# Patient Record
Sex: MALE | Race: WHITE | NOT HISPANIC OR LATINO | URBAN - METROPOLITAN AREA
[De-identification: names, ages, dates, MRNs, and addresses within clinical notes are randomized per-mention and may not be internally consistent; named-entity substitution may affect disease eponyms.]

---

## 2023-08-07 LAB
ALANINE AMINOTRANSFERASE (SGPT) (U/L) IN SER/PLAS: 19 U/L (ref 10–52)
ALBUMIN (G/DL) IN SER/PLAS: 4.5 G/DL (ref 3.4–5)
ALKALINE PHOSPHATASE (U/L) IN SER/PLAS: 67 U/L (ref 33–136)
ANION GAP IN SER/PLAS: 12 MMOL/L (ref 10–20)
ASPARTATE AMINOTRANSFERASE (SGOT) (U/L) IN SER/PLAS: 26 U/L (ref 9–39)
BILIRUBIN TOTAL (MG/DL) IN SER/PLAS: 2 MG/DL (ref 0–1.2)
CALCIUM (MG/DL) IN SER/PLAS: 9.2 MG/DL (ref 8.6–10.3)
CARBON DIOXIDE, TOTAL (MMOL/L) IN SER/PLAS: 27 MMOL/L (ref 21–32)
CHLORIDE (MMOL/L) IN SER/PLAS: 103 MMOL/L (ref 98–107)
CHOLESTEROL (MG/DL) IN SER/PLAS: 191 MG/DL (ref 0–199)
CHOLESTEROL IN HDL (MG/DL) IN SER/PLAS: 60.6 MG/DL
CHOLESTEROL/HDL RATIO: 3.2
CREATININE (MG/DL) IN SER/PLAS: 0.93 MG/DL (ref 0.5–1.3)
GFR MALE: >90 ML/MIN/1.73M2
GLUCOSE (MG/DL) IN SER/PLAS: 89 MG/DL (ref 74–99)
LDL: 117 MG/DL (ref 0–99)
POTASSIUM (MMOL/L) IN SER/PLAS: 4.4 MMOL/L (ref 3.5–5.3)
PROTEIN TOTAL: 7.2 G/DL (ref 6.4–8.2)
SODIUM (MMOL/L) IN SER/PLAS: 138 MMOL/L (ref 136–145)
TRIGLYCERIDE (MG/DL) IN SER/PLAS: 68 MG/DL (ref 0–149)
UREA NITROGEN (MG/DL) IN SER/PLAS: 15 MG/DL (ref 6–23)
VLDL: 14 MG/DL (ref 0–40)

## 2023-10-19 ENCOUNTER — HOSPITAL ENCOUNTER (OUTPATIENT)
Dept: NEUROLOGY | Facility: CLINIC | Age: 63
Discharge: HOME | End: 2023-10-19
Payer: COMMERCIAL

## 2023-10-19 DIAGNOSIS — G56.02 LEFT CARPAL TUNNEL SYNDROME: ICD-10-CM

## 2023-10-19 PROCEDURE — 95911 NRV CNDJ TEST 9-10 STUDIES: CPT | Performed by: SPECIALIST

## 2023-10-19 PROCEDURE — 95886 MUSC TEST DONE W/N TEST COMP: CPT | Performed by: SPECIALIST

## 2023-10-19 PROCEDURE — 95909 NRV CNDJ TST 5-6 STUDIES: CPT | Performed by: SPECIALIST

## 2024-03-12 ENCOUNTER — HOSPITAL ENCOUNTER (OUTPATIENT)
Dept: RADIOLOGY | Facility: HOSPITAL | Age: 64
Discharge: HOME | End: 2024-03-12

## 2024-03-12 DIAGNOSIS — E78.5 HYPERLIPIDEMIA, UNSPECIFIED: ICD-10-CM

## 2024-03-12 DIAGNOSIS — Z82.49 FAMILY HISTORY OF ISCHEMIC HEART DISEASE AND OTHER DISEASES OF THE CIRCULATORY SYSTEM: ICD-10-CM

## 2024-03-12 PROCEDURE — 75571 CT HRT W/O DYE W/CA TEST: CPT

## 2024-03-14 ENCOUNTER — TELEPHONE (OUTPATIENT)
Dept: NEPHROLOGY | Facility: CLINIC | Age: 64
End: 2024-03-14
Payer: COMMERCIAL

## 2024-03-14 NOTE — TELEPHONE ENCOUNTER
----- Message from Thiago Tidwell MD sent at 3/13/2024  4:29 PM EDT -----  CT shows potential blockages in heart arteries.  Sched OV 1-2 weeks

## 2024-03-15 ENCOUNTER — TELEPHONE (OUTPATIENT)
Dept: CARDIOLOGY | Facility: CLINIC | Age: 64
End: 2024-03-15
Payer: COMMERCIAL

## 2024-03-15 NOTE — TELEPHONE ENCOUNTER
Patient's wife called and requested to have lab orders mailed so they can have the labs done at [facility].

## 2024-03-27 NOTE — TELEPHONE ENCOUNTER
Pt left vm requesting lab order, left vm to call office back to advise he does not need labs done.

## 2024-03-28 ENCOUNTER — OFFICE VISIT (OUTPATIENT)
Dept: PRIMARY CARE | Facility: CLINIC | Age: 64
End: 2024-03-28
Payer: COMMERCIAL

## 2024-03-28 VITALS
BODY MASS INDEX: 25.17 KG/M2 | TEMPERATURE: 97.3 F | HEART RATE: 71 BPM | WEIGHT: 185.8 LBS | HEIGHT: 72 IN | SYSTOLIC BLOOD PRESSURE: 119 MMHG | DIASTOLIC BLOOD PRESSURE: 72 MMHG

## 2024-03-28 DIAGNOSIS — Z12.5 PROSTATE CANCER SCREENING: ICD-10-CM

## 2024-03-28 DIAGNOSIS — E78.5 HYPERLIPIDEMIA, UNSPECIFIED HYPERLIPIDEMIA TYPE: ICD-10-CM

## 2024-03-28 DIAGNOSIS — E66.3 OVERWEIGHT WITH BODY MASS INDEX (BMI) 25.0-29.9: ICD-10-CM

## 2024-03-28 DIAGNOSIS — Z78.9 NEVER SMOKED CIGARETTES: ICD-10-CM

## 2024-03-28 DIAGNOSIS — R93.1 AGATSTON CAC SCORE, >400: ICD-10-CM

## 2024-03-28 PROCEDURE — 99213 OFFICE O/P EST LOW 20 MIN: CPT | Performed by: FAMILY MEDICINE

## 2024-03-28 PROCEDURE — 1036F TOBACCO NON-USER: CPT | Performed by: FAMILY MEDICINE

## 2024-03-28 RX ORDER — ASPIRIN 81 MG/1
81 TABLET ORAL DAILY
Qty: 90 TABLET | Refills: 3 | Status: SHIPPED | OUTPATIENT
Start: 2024-03-28

## 2024-03-28 RX ORDER — ASPIRIN 81 MG/1
81 TABLET ORAL DAILY PRN
COMMUNITY
End: 2024-03-28 | Stop reason: SDUPTHER

## 2024-03-28 RX ORDER — VIT A/VIT C/VIT E/ZINC/COPPER 4296-226
1 CAPSULE ORAL DAILY
COMMUNITY

## 2024-03-28 ASSESSMENT — PATIENT HEALTH QUESTIONNAIRE - PHQ9
1. LITTLE INTEREST OR PLEASURE IN DOING THINGS: NOT AT ALL
2. FEELING DOWN, DEPRESSED OR HOPELESS: NOT AT ALL
SUM OF ALL RESPONSES TO PHQ9 QUESTIONS 1 AND 2: 0

## 2024-03-28 NOTE — PROGRESS NOTES
"Subjective Ed is here for follow-up on his CT cardiac calcium score.  He states that he is feeling well and has no complaints.  He has no chest pain shortness of breath lightheadedness or syncope.  He did have a stress test at least 5 years ago possibly longer.  There is a family history of coronary artery disease in his father.  Also family history of abdominal aortic aneurysm in his father.  He had a normal ultrasound about 4 years ago.  Recent CT cardiac calcium score is total 450.    Patient ID: Matthew Cervantes \"Ed\" is a 63 y.o. male who presents for Results:    Problem List Items Addressed This Visit    None     History reviewed. No pertinent past medical history.   History reviewed. No pertinent surgical history.   Family History   Problem Relation Name Age of Onset    Depression Mother Jacqueline Cervantes       Social History     Socioeconomic History    Marital status:      Spouse name: Not on file    Number of children: Not on file    Years of education: Not on file    Highest education level: Not on file   Occupational History    Not on file   Tobacco Use    Smoking status: Never    Smokeless tobacco: Never   Substance and Sexual Activity    Alcohol use: Yes     Alcohol/week: 3.0 standard drinks of alcohol     Types: 3 Cans of beer per week    Drug use: Never    Sexual activity: Yes     Partners: Female     Birth control/protection: None   Other Topics Concern    Not on file   Social History Narrative    Not on file     Social Determinants of Health     Financial Resource Strain: Not on file   Food Insecurity: Not on file   Transportation Needs: Not on file   Physical Activity: Not on file   Stress: Not on file   Social Connections: Not on file   Intimate Partner Violence: Not on file   Housing Stability: Not on file      Patient has no known allergies.   Current Outpatient Medications   Medication Sig Dispense Refill    aspirin 81 mg EC tablet Take 1 tablet (81 mg) by mouth once daily as needed for mild " pain (1 - 3).      multivitamin with minerals tablet Take 1 tablet by mouth once daily.      vitamins A,C,E-zinc-copper (PreserVision AREDS) 4,296 mcg-226 mg-90 mg capsule Take 1 capsule by mouth once daily.       No current facility-administered medications for this visit.       Immunization History   Administered Date(s) Administered    Moderna COVID-19 vaccine, bivalent, blue cap/gray label *Check age/dose* 10/16/2022    Moderna SARS-CoV-2 Vaccination 03/17/2021, 04/14/2021, 12/04/2021        Review of Systems   Constitutional: Negative.    HENT: Negative.     Eyes: Negative.    Respiratory: Negative.     Cardiovascular: Negative.    Gastrointestinal: Negative.    Endocrine: Negative.    Genitourinary: Negative.    Musculoskeletal: Negative.    Skin: Negative.    Allergic/Immunologic: Negative.    Hematological: Negative.    Psychiatric/Behavioral: Negative.     All other systems reviewed and are negative.       Vitals:    03/28/24 1615   BP: 119/72   Pulse: 71   Temp: 36.3 °C (97.3 °F)     Vitals:    03/28/24 1615   Weight: 84.3 kg (185 lb 12.8 oz)      Physical Exam  Constitutional:       General: He is not in acute distress.     Appearance: Normal appearance.   Neck:      Vascular: No carotid bruit.   Cardiovascular:      Rate and Rhythm: Normal rate and regular rhythm.      Pulses: Normal pulses.      Heart sounds: Normal heart sounds. No murmur heard.     No friction rub. No gallop.   Pulmonary:      Effort: Pulmonary effort is normal. No respiratory distress.      Breath sounds: Normal breath sounds. No wheezing or rales.   Musculoskeletal:      Cervical back: Normal range of motion and neck supple.   Neurological:      Mental Status: He is alert.          ASSESSMENT/PLAN: Elevated  CT cardiac calcium score.  Family history of CAD.  Patient is referred to  cardiology for evaluation we discussed the importance of this.  He is currently on aspirin 81 mg daily.  Obtain CMP lipid profile and PSA.  Will need  statin medication pending above labs and cardiology evaluation.  Will also need follow-up ultrasound of abdominal aorta.  Patient instructed to go to the ER for any chest pain shortness of breath or any other cardiac symptoms.  Follow-up pending above and call as needed       Scribe Attestation  By signing my name below, I, Steff Mackenzie LPN, Scribe   attest that this documentation has been prepared under the direction and in the presence of Thiago Tidwell MD.

## 2024-03-30 ASSESSMENT — ENCOUNTER SYMPTOMS
RESPIRATORY NEGATIVE: 1
HEMATOLOGIC/LYMPHATIC NEGATIVE: 1
PSYCHIATRIC NEGATIVE: 1
GASTROINTESTINAL NEGATIVE: 1
CARDIOVASCULAR NEGATIVE: 1
EYES NEGATIVE: 1
CONSTITUTIONAL NEGATIVE: 1
ENDOCRINE NEGATIVE: 1
ALLERGIC/IMMUNOLOGIC NEGATIVE: 1
MUSCULOSKELETAL NEGATIVE: 1

## 2024-04-12 ENCOUNTER — OFFICE VISIT (OUTPATIENT)
Dept: CARDIOLOGY | Facility: CLINIC | Age: 64
End: 2024-04-12
Payer: COMMERCIAL

## 2024-04-12 VITALS
BODY MASS INDEX: 24.79 KG/M2 | DIASTOLIC BLOOD PRESSURE: 80 MMHG | HEIGHT: 72 IN | TEMPERATURE: 97.2 F | HEART RATE: 62 BPM | WEIGHT: 183 LBS | SYSTOLIC BLOOD PRESSURE: 114 MMHG

## 2024-04-12 DIAGNOSIS — Z78.9 NEVER SMOKED ANY SUBSTANCE: ICD-10-CM

## 2024-04-12 DIAGNOSIS — R93.1 AGATSTON CAC SCORE, >400: ICD-10-CM

## 2024-04-12 DIAGNOSIS — E78.00 ELEVATED LDL CHOLESTEROL LEVEL: Primary | ICD-10-CM

## 2024-04-12 PROCEDURE — 99204 OFFICE O/P NEW MOD 45 MIN: CPT | Performed by: INTERNAL MEDICINE

## 2024-04-12 PROCEDURE — 93000 ELECTROCARDIOGRAM COMPLETE: CPT | Performed by: INTERNAL MEDICINE

## 2024-04-12 PROCEDURE — 1036F TOBACCO NON-USER: CPT | Performed by: INTERNAL MEDICINE

## 2024-04-12 RX ORDER — ATORVASTATIN CALCIUM 40 MG/1
40 TABLET, FILM COATED ORAL DAILY
Qty: 90 TABLET | Refills: 3 | Status: SHIPPED | OUTPATIENT
Start: 2024-04-12 | End: 2025-04-12

## 2024-04-12 ASSESSMENT — PATIENT HEALTH QUESTIONNAIRE - PHQ9
SUM OF ALL RESPONSES TO PHQ9 QUESTIONS 1 AND 2: 0
1. LITTLE INTEREST OR PLEASURE IN DOING THINGS: NOT AT ALL
2. FEELING DOWN, DEPRESSED OR HOPELESS: NOT AT ALL

## 2024-04-12 ASSESSMENT — ENCOUNTER SYMPTOMS
EYES NEGATIVE: 1
GASTROINTESTINAL NEGATIVE: 1
MUSCULOSKELETAL NEGATIVE: 1
CARDIOVASCULAR NEGATIVE: 1
RESPIRATORY NEGATIVE: 1
NUMBNESS: 1
PSYCHIATRIC NEGATIVE: 1

## 2024-04-12 NOTE — PROGRESS NOTES
"CARDIOLOGY NEW PATIENT OFFICE VISIT    Patient:  Matthew Cervantes  YOB: 1960  MRN: [id number]       Chief Complaint/Active Symptoms:       Matthew Cervantes is a 63 y.o. male who is being seen today at the request of DR. Tidwell for evaluation of Elevated CAC.    Chief Complaint   Patient presents with    Establish Care       History of Present Illness:   HPI    Here due to an elevated CT cardiac score of 415. This was done electively due to a family history of CAD. In terms of symptoms he has no chest pain or discomfort. No shortness of breath, no palpitations, dizziness or lightheadedness.     Had a feeling of \"cold\" in his hands and pale but not in response to a cold environment of submersion in cold water which has mostly resolved. Also had mild left calf swelling (not ankle/feet) which has resolved.     Had cardiac work-up in 2016 for CP at that time and work-up with nuclear stress test and echo was normal.     FH:  Mother with CHF, maternal cousins with CAD at young age  Father had CABG in his 60's  Allergies:     No Known Allergies     Outpatient Medications:     Current Outpatient Medications   Medication Instructions    aspirin 81 mg, oral, Daily    multivitamin with minerals tablet 1 tablet, oral, Daily    vitamins A,C,E-zinc-copper (PreserVision AREDS) 4,296 mcg-226 mg-90 mg capsule 1 capsule, oral, Daily        Past Medical History:     No past medical history on file.    Social History:     Social History     Socioeconomic History    Marital status:      Spouse name: None    Number of children: None    Years of education: None    Highest education level: None   Occupational History    None   Tobacco Use    Smoking status: Never    Smokeless tobacco: Never   Substance and Sexual Activity    Alcohol use: Yes     Alcohol/week: 3.0 standard drinks of alcohol     Types: 3 Cans of beer per week    Drug use: Never    Sexual activity: Yes     Partners: Female     Birth control/protection: None "   Other Topics Concern    None   Social History Narrative    None     Social Determinants of Health     Financial Resource Strain: Not on file   Food Insecurity: Not on file   Transportation Needs: Not on file   Physical Activity: Not on file   Stress: Not on file   Social Connections: Not on file   Intimate Partner Violence: Not on file   Housing Stability: Not on file       Family History:        Family History   Problem Relation Name Age of Onset    Depression Mother Jacqueline Cervantes     Other (aaa) Father         Review of Systems:     Review of Systems   HENT: Negative.     Eyes: Negative.    Respiratory: Negative.     Cardiovascular: Negative.    Gastrointestinal: Negative.    Genitourinary: Negative.    Musculoskeletal: Negative.    Neurological:  Positive for numbness (carpal tunnel left hand).   Psychiatric/Behavioral: Negative.     All other systems reviewed and are negative.      Objective:     Vitals:    04/12/24 0759   BP: 114/80   Pulse: 62   Temp: 36.2 °C (97.2 °F)       Vitals:    04/12/24 0759   Weight: 83 kg (183 lb)       Physical Examination:   GENERAL:  Well developed, well nourished, in no acute distress.  HEENT: NC AT EOMI with anicteric sclera  NECK:  Supple, no JVD, no bruit.  CHEST:  Symmetric and nontender.  LUNGS:  Normal respiratory effort. Bilateral breath sounds clear to auscultation.   HEART:  PMI nondisplaced. Rate and rhythm regular with no evident murmur, no gallop appreciated. There are no rubs, clicks or heaves.  PERIPHERAL VASCULAR:  Pulses present and equally palpable; 2+ throughout.  ABDOMEN: Soft, NT, ND without HSM or palpable organomegaly, no bruits, normoactive bowel sounds  MUSCULOSKELETAL: No significant joint or limb deformity  EXTREMITIES:  Warm with good color, no clubbing or cyanosis.  There is no edema noted.  NEURO/PSYCH:  Alert and oriented times three with approppriate behavior and responses.  LYMPH: no significant palpable lymphadenopathy  SKIN: No rashes on  "exposed skin, no reported skin lesions.     Lab:     CBC:   Lab Results   Component Value Date    WBC 5.6 02/02/2023    RBC 5.35 02/02/2023    HGB 15.9 02/02/2023    HCT 48.3 02/02/2023     02/02/2023      Lab Results   Component Value Date    WBC 5.6 02/02/2023    WBC 7.0 04/30/2021    WBC 5.2 12/06/2019    RBC 5.35 02/02/2023    RBC 5.35 04/30/2021    RBC 5.14 12/06/2019    HGB 15.9 02/02/2023    HGB 16.2 04/30/2021    HGB 15.2 12/06/2019    HCT 48.3 02/02/2023    HCT 50.4 04/30/2021    HCT 46.7 12/06/2019    MCV 90 02/02/2023    MCV 94 04/30/2021    MCV 91 12/06/2019    MCHC 32.9 02/02/2023    MCHC 32.1 04/30/2021    MCHC 32.5 12/06/2019    RDW 12.0 02/02/2023    RDW 12.4 04/30/2021    RDW 12.4 12/06/2019     02/02/2023     04/30/2021     12/06/2019       CMP:    Lab Results   Component Value Date     08/07/2023    K 4.4 08/07/2023     08/07/2023    CO2 27 08/07/2023    BUN 15 08/07/2023    CREATININE 0.93 08/07/2023    GLUCOSE 89 08/07/2023    CALCIUM 9.2 08/07/2023     Lab Results   Component Value Date     08/07/2023     02/02/2023     04/30/2021    K 4.4 08/07/2023    K 4.3 02/02/2023    K 4.2 04/30/2021     08/07/2023     02/02/2023     04/30/2021    CO2 27 08/07/2023    CO2 29 02/02/2023    CO2 27 04/30/2021    BUN 15 08/07/2023    BUN 10 02/02/2023    BUN 17 04/30/2021    CREATININE 0.93 08/07/2023    CREATININE 0.95 02/02/2023    CREATININE 1.06 04/30/2021     Lab Results   Component Value Date    ALKPHOS 67 08/07/2023    ALT 19 08/07/2023    AST 26 08/07/2023    PROT 7.2 08/07/2023    BILITOT 2.0 (H) 08/07/2023    BILIDIR 0.1 05/19/2021       Magnesium:    No results found for: \"MG\"    Lipid Profile:    Lab Results   Component Value Date    TRIG 68 08/07/2023    HDL 60.6 08/07/2023       TSH:    No results found for: \"TSH\"    BNP:   No results found for: \"BNP\"     PT/INR:    No results found for: \"PROTIME\", \"INR\"    HgBA1c:    No " "results found for: \"HGBA1C\"    Cardiac Enzymes:    No results found for: \"TROPHS\"      Diagnostic Studies:     No echocardiogram results found for the past 12 months  Prior echocardiogram in June 2016 showed normal LV function with an EF of 60 to 65% no significant valvular abnormalities.  Abdominal ultrasound screening for AAA showed mild diffuse atherosclerosis with normal-sized aorta  No nuclear medicine results found for the past 12 months  Last nuclear stress test was performed in June 2016 that showed normal myocardial perfusion, normal wall motion and normal ejection fraction  No valid procedures specified.  EKG today shows normal sinus rhythm, normal EKG  Radiology:     No valid procedures specified.    Assessment:     Problem List Items Addressed This Visit       Never smoked any substance    Agatston CAC score, >400    Relevant Medications    atorvastatin (Lipitor) 40 mg tablet    Other Relevant Orders    ECG 12 lead (Clinic Performed) (Completed)    Lipid Panel    Elevated LDL cholesterol level - Primary    Relevant Medications    atorvastatin (Lipitor) 40 mg tablet    Other Relevant Orders    Lipid Panel       Plan:     1.  Coronary calcium score 450.  The patient is asymptomatic with a score of 450.  He has had a prior cardiac workup that has been negative.  At this point in time he needs to be on statin medications with an LDL cholesterol ideally less than 70.  If he becomes symptomatic would recommend stress testing.  Otherwise we will treat him with aggressive lipid lowering and repeat a CT cardiac score in 2 years.  In the asymptomatic state will do a stress test at greater than 800.  We reviewed the signs and symptoms of coronary artery disease and angina pectoris and when to seek emergency medical attention when to call for an earlier office appointment.  2.  Elevated LDL cholesterol.  The patient has had a mild elevation of his LDL cholesterol for many years.  Set statin therapy is indicated for " a calcium score greater than 100.  I recommended a moderate intensity statin and requested get his lipid panel done in 3 months.  If he is not at his goal we will then increase the statin or add Zetia to his medical regimen.  3.  Tobacco weight status.  The patient is a non-smoker and is at his ideal body weight.  A heart healthy Mediterranean diet was recommended.  On review of this patient states he is pretty much following that diet already.    In the absence of symptoms we will see the patient in follow-up in a years time sooner if he has any questions or concerns.

## 2024-04-15 ENCOUNTER — APPOINTMENT (OUTPATIENT)
Dept: PRIMARY CARE | Facility: CLINIC | Age: 64
End: 2024-04-15
Payer: COMMERCIAL

## 2024-07-19 DIAGNOSIS — R93.1 AGATSTON CAC SCORE, >400: ICD-10-CM

## 2024-07-19 DIAGNOSIS — E78.00 ELEVATED LDL CHOLESTEROL LEVEL: ICD-10-CM

## 2024-07-19 RX ORDER — ATORVASTATIN CALCIUM 40 MG/1
40 TABLET, FILM COATED ORAL DAILY
Qty: 90 TABLET | Refills: 3 | Status: SHIPPED | OUTPATIENT
Start: 2024-07-19 | End: 2025-07-19

## 2024-07-19 NOTE — TELEPHONE ENCOUNTER
Pt left  requesting refill to [facility].   Lov 4/12/24  Pov 4/11/25

## 2025-04-04 ENCOUNTER — PATIENT MESSAGE (OUTPATIENT)
Dept: PRIMARY CARE | Facility: CLINIC | Age: 65
End: 2025-04-04

## 2025-04-04 DIAGNOSIS — Z00.00 ANNUAL WELLNESS VISIT: Primary | ICD-10-CM

## 2025-04-04 DIAGNOSIS — R93.1 AGATSTON CAC SCORE, >400: ICD-10-CM

## 2025-04-04 DIAGNOSIS — E78.00 ELEVATED LDL CHOLESTEROL LEVEL: ICD-10-CM

## 2025-04-11 ENCOUNTER — APPOINTMENT (OUTPATIENT)
Dept: CARDIOLOGY | Facility: CLINIC | Age: 65
End: 2025-04-11
Payer: COMMERCIAL

## 2025-04-25 ENCOUNTER — APPOINTMENT (OUTPATIENT)
Dept: CARDIOLOGY | Facility: CLINIC | Age: 65
End: 2025-04-25
Payer: COMMERCIAL

## 2025-04-25 VITALS
HEART RATE: 65 BPM | HEIGHT: 72 IN | SYSTOLIC BLOOD PRESSURE: 118 MMHG | DIASTOLIC BLOOD PRESSURE: 72 MMHG | WEIGHT: 182.6 LBS | BODY MASS INDEX: 24.73 KG/M2

## 2025-04-25 DIAGNOSIS — E78.00 ELEVATED LDL CHOLESTEROL LEVEL: ICD-10-CM

## 2025-04-25 DIAGNOSIS — R93.1 AGATSTON CAC SCORE, >400: Primary | ICD-10-CM

## 2025-04-25 DIAGNOSIS — Z78.9 NEVER SMOKED ANY SUBSTANCE: ICD-10-CM

## 2025-04-25 PROCEDURE — 3008F BODY MASS INDEX DOCD: CPT | Performed by: INTERNAL MEDICINE

## 2025-04-25 PROCEDURE — G2211 COMPLEX E/M VISIT ADD ON: HCPCS | Performed by: INTERNAL MEDICINE

## 2025-04-25 PROCEDURE — 99214 OFFICE O/P EST MOD 30 MIN: CPT | Performed by: INTERNAL MEDICINE

## 2025-04-25 PROCEDURE — 93000 ELECTROCARDIOGRAM COMPLETE: CPT | Performed by: INTERNAL MEDICINE

## 2025-04-25 RX ORDER — GLUCOSAMINE/CHONDR SU A SOD 250-200 MG
TABLET ORAL
COMMUNITY

## 2025-04-25 ASSESSMENT — ENCOUNTER SYMPTOMS
RESPIRATORY NEGATIVE: 1
CARDIOVASCULAR NEGATIVE: 1
NEUROLOGICAL NEGATIVE: 1
PSYCHIATRIC NEGATIVE: 1
GASTROINTESTINAL NEGATIVE: 1

## 2025-04-25 NOTE — PROGRESS NOTES
Patient:  Matthew Cervantes  YOB: 1960  MRN: [id number]       Chief Complaint/Active Symptoms:       Matthew Cervantes is a 64 y.o. male who returns today for cardiac follow-up of elevated coronary artery calcium score and elevated LDL cholesterol.    Had COVID 10 days ago.  Had typical viral URI symptoms.  He has no sequela at this time.    Outside of when he had COVID he has been well.  He has no chest pain, shortness of breath, orthopnea or PND.  He has no palpitations, dizziness or lightheadedness.  No stroke or TIA symptoms.    He had a lot of questions regarding the treatment of his plaque as well as his cholesterol.  He described how when he was taking the statin medications he developed some flank pain.  He stopped the statin medication and the flank pain went away.  He does not recall other muscle groups being sore.  He has been trying diet and exercise and is waiting for the result of his lipid panel.  He wishes to continue that as he was trying to avoid restarting any type of medications for his cholesterol.    We spent a long time discussing the indications for statin therapy and the reason behind it.  We discussed ways to tolerate statins trying other statins from a different group such as the water-soluble statins, intermittent dosing, and the use of co-Q10 to help manage symptoms such as muscle aches.  We decided after discussion that we would wait for his current lipid panel so we can decide the type and dose of statin to restart.  We did recommend again trying a statin therapy because of his CT cardiac score and presence of coronary plaque.      Review of Systems   Respiratory: Negative.     Cardiovascular: Negative.    Gastrointestinal: Negative.    Genitourinary: Negative.    Neurological: Negative.    Psychiatric/Behavioral: Negative.     All other systems reviewed and are negative.      Objective:     Vitals:    04/25/25 1457   BP: 118/72   Pulse: 65       Vitals:    04/25/25 1457  "  Weight: 82.8 kg (182 lb 9.6 oz)       Allergies:     Allergies[1]       Medications:     Current Outpatient Medications   Medication Instructions    aspirin 81 mg, oral, Daily    atorvastatin (LIPITOR) 40 mg, oral, Daily    glucosamine-chondroitin (Osteo Bi-Flex) 250-200 mg tablet oral    vitamins A,C,E-zinc-copper (PreserVision AREDS) 4,296 mcg-226 mg-90 mg capsule 1 capsule, Daily       Physical Examination:   GENERAL:  Well developed, well nourished, in no acute distress.  HEENT: NC AT, EOMI with anicteric sclera  NECK:  Supple, no JVD, no bruit.  CHEST:  Symmetric and nontender.  LUNGS:  Clear to auscultation bilaterally, normal respiratory effort.  HEART:  PMI is nondisplaced. RRR with normal S1 and S2, no S3, no mumur or rub. No carotid or abdominal bruits  ABDOMEN: Soft, NT, ND without palpable organomegaly or bruits  EXTREMITIES:  Warm with good color, no clubbing or cyanosis.  There is no edema noted.  PERIPHERAL VASCULAR:  Pulses present and equally palpable; 2+ throughout.  MUSCULOSKELETAL: No significant joint or limb deformity  NEURO/PSYCH:  Alert and oriented times three with approppriate behavior and responses. Nonfocal motor examination with normal gait and ambulation  Lymph: No significant palpable lymphadenopathy  Skin: no rash or lesions on exposed skin or reported.    Lab:     CBC:   Lab Results   Component Value Date    WBC 5.6 02/02/2023    RBC 5.35 02/02/2023    HGB 15.9 02/02/2023    HCT 48.3 02/02/2023     02/02/2023        CMP:    Lab Results   Component Value Date     08/07/2023    K 4.4 08/07/2023     08/07/2023    CO2 27 08/07/2023    BUN 15 08/07/2023    CREATININE 0.93 08/07/2023    GLUCOSE 89 08/07/2023    CALCIUM 9.2 08/07/2023       Magnesium:    No results found for: \"MG\"    Lipid Profile:    Lab Results   Component Value Date    TRIG 68 08/07/2023    HDL 60.6 08/07/2023       TSH:    No results found for: \"TSH\"    BNP:   No results found for: \"BNP\"     PT/INR: " "   No results found for: \"PROTIME\", \"INR\"    HgBA1c:    No results found for: \"HGBA1C\"    BMP:  Lab Results   Component Value Date     08/07/2023     02/02/2023     04/30/2021    K 4.4 08/07/2023    K 4.3 02/02/2023    K 4.2 04/30/2021     08/07/2023     02/02/2023     04/30/2021    CO2 27 08/07/2023    CO2 29 02/02/2023    CO2 27 04/30/2021    BUN 15 08/07/2023    BUN 10 02/02/2023    BUN 17 04/30/2021    CREATININE 0.93 08/07/2023    CREATININE 0.95 02/02/2023    CREATININE 1.06 04/30/2021       Cardiac Enzymes:    No results found for: \"TROPHS\"    Hepatic Function Panel:    Lab Results   Component Value Date    ALKPHOS 67 08/07/2023    ALT 19 08/07/2023    AST 26 08/07/2023    PROT 7.2 08/07/2023    BILITOT 2.0 (H) 08/07/2023    BILIDIR 0.1 05/19/2021     Labs reviewed.  Repeat lipid panel not yet drawn    Diagnostic Studies:     No echocardiogram results found for the past 12 months    No nuclear medicine results found for the past 12 months    No valid procedures specified.    EKG:   EKG today - NSR, normal EKG    Radiology:     No orders to display     ASSESSMENT     Problem List Items Addressed This Visit       Never smoked any substance    Agatston CAC score, >400 - Primary    Relevant Orders    ECG 12 lead (Clinic Performed)    Follow Up In Cardiology    Elevated LDL cholesterol level    Relevant Orders    Follow Up In Cardiology       PLAN       1.  Coronary calcium score greater than 400.  He had a score of almost 300 in his left anterior descending artery but half at 150 in the right coronary artery.  We explained what this represents the fact that he does have plaque and he is at increased risk for cardiovascular events but not at the highest risk which is a score greater than 800.  He remains asymptomatic but risk factor modification is important.  2.  Elevated LDL cholesterol.  While the patient is not at the highest risk things we do now to decrease his " cardiovascular risk of progression of his underlying plaque are important to prevent future events or to delay future events.  I do not feel strongly about aspirin therapy he can hold off until his calcium score is greater than 800-1000.  I once we have the lipid panel I do recommend restarting a statin.  I will try a water-soluble statin such as pravastatin or rosuvastatin.  If we need to 30% reduction or higher I will start with rosuvastatin.  We recommend that when we do this he would start co-Q10 twice daily about a week before starting the statin and will start with trying every other day statin and then if he tolerates that without any type of muscle aches or flank pain will increase it to daily statin.    He understands where we are coming from.  He knows that ultimately it is his decision on whether to start the medications or not but he understands very clearly today after our discussion the reasoning behind these recommendations.  We have other therapies that we could initiate such as ezetimibe that have been shown to have some increased cardiovascular events but these reductions are usually in association with low-dose statin therapy.  At this time I am not sure whether he would qualify for the PCSK9 inhibitors.  He certainly would be after his first event we will have to look into that option if he does not tolerate intermittent statin therapy.  I would like to see his LDL cholesterol goal less than 100 and ideally less than 70 for reduction of cardiovascular events and he is aware of these recommendations.    Will await his lipid panel and follow-up lipid panel on what ever dose of statin he can tolerate.  If he remains asymptomatic otherwise we will see him in the office in follow-up in a year.                 [1] No Known Allergies

## 2025-04-25 NOTE — PATIENT INSTRUCTIONS
Qunol - Co Q 10 100-150 mg twice daily    Will wait for lipid panel to check which statin and what dose we will use     When I recommend at statin - start CoQ10 twice daily for a week before starting the statin medication.    Let me know if any problems with starting the statin    Will check labs 2-3 months after starting the medication

## 2025-05-04 LAB
ALBUMIN SERPL-MCNC: 4.5 G/DL (ref 3.6–5.1)
ALP SERPL-CCNC: 71 U/L (ref 35–144)
ALT SERPL-CCNC: 14 U/L (ref 9–46)
ANION GAP SERPL CALCULATED.4IONS-SCNC: 5 MMOL/L (CALC) (ref 7–17)
AST SERPL-CCNC: 18 U/L (ref 10–35)
BILIRUB SERPL-MCNC: 1.2 MG/DL (ref 0.2–1.2)
BUN SERPL-MCNC: 13 MG/DL (ref 7–25)
CALCIUM SERPL-MCNC: 9.4 MG/DL (ref 8.6–10.3)
CHLORIDE SERPL-SCNC: 105 MMOL/L (ref 98–110)
CHOLEST SERPL-MCNC: 202 MG/DL
CHOLEST/HDLC SERPL: 3.6 (CALC)
CO2 SERPL-SCNC: 30 MMOL/L (ref 20–32)
CREAT SERPL-MCNC: 0.86 MG/DL (ref 0.7–1.35)
EGFRCR SERPLBLD CKD-EPI 2021: 97 ML/MIN/1.73M2
ERYTHROCYTE [DISTWIDTH] IN BLOOD BY AUTOMATED COUNT: 11.8 % (ref 11–15)
GLUCOSE SERPL-MCNC: 93 MG/DL (ref 65–99)
HCT VFR BLD AUTO: 48 % (ref 38.5–50)
HDLC SERPL-MCNC: 56 MG/DL
HGB BLD-MCNC: 16.1 G/DL (ref 13.2–17.1)
LDLC SERPL CALC-MCNC: 124 MG/DL (CALC)
MCH RBC QN AUTO: 31 PG (ref 27–33)
MCHC RBC AUTO-ENTMCNC: 33.5 G/DL (ref 32–36)
MCV RBC AUTO: 92.3 FL (ref 80–100)
NONHDLC SERPL-MCNC: 146 MG/DL (CALC)
PLATELET # BLD AUTO: 222 THOUSAND/UL (ref 140–400)
PMV BLD REES-ECKER: 11.1 FL (ref 7.5–12.5)
POTASSIUM SERPL-SCNC: 5.1 MMOL/L (ref 3.5–5.3)
PROT SERPL-MCNC: 7.1 G/DL (ref 6.1–8.1)
PSA SERPL-MCNC: 0.41 NG/ML
RBC # BLD AUTO: 5.2 MILLION/UL (ref 4.2–5.8)
SODIUM SERPL-SCNC: 140 MMOL/L (ref 135–146)
TRIGL SERPL-MCNC: 114 MG/DL
WBC # BLD AUTO: 4.5 THOUSAND/UL (ref 3.8–10.8)

## 2025-05-05 RX ORDER — ATORVASTATIN CALCIUM 40 MG/1
40 TABLET, FILM COATED ORAL DAILY
Qty: 90 TABLET | Refills: 3 | Status: SHIPPED | OUTPATIENT
Start: 2025-05-05 | End: 2026-05-05

## 2025-05-05 RX ORDER — ROSUVASTATIN CALCIUM 10 MG/1
10 TABLET, COATED ORAL DAILY
Qty: 100 TABLET | Refills: 3 | Status: SHIPPED | OUTPATIENT
Start: 2025-05-05 | End: 2026-06-09

## 2025-05-07 ENCOUNTER — TELEPHONE (OUTPATIENT)
Dept: PRIMARY CARE | Facility: CLINIC | Age: 65
End: 2025-05-07
Payer: COMMERCIAL

## 2025-05-07 NOTE — TELEPHONE ENCOUNTER
Lvm advising pt to return call   Patient is requesting Trazodone 50 mg to be send to [id number] on [address], [place], [place], [address]

## 2025-05-07 NOTE — TELEPHONE ENCOUNTER
----- Message from Thiago Tidwell sent at 5/5/2025  4:48 PM EDT -----  Labs normal exc chol sl elevated at 202.  Recommend increase Atorvastatin to 80 mg daily.  Recheck labs 3 mos  ----- Message -----  From: [facility] Results In  Sent: 5/4/2025   2:39 AM EDT  To: Thiago Tidwell MD

## 2025-05-07 NOTE — TELEPHONE ENCOUNTER
----- Message from Thiago Tidwell sent at 5/5/2025  4:57 PM EDT -----  Disregard previous message regarding increasing Atorvastatin.  Chol sl elevated--saw note per Dr Barragan about side effects with Atorvastatin.  Try lower dose Rosuvastatin and recheck labs 3 mos  ----- Message -----  From: [employer] Results In  Sent: 5/4/2025   2:39 AM EDT  To: Thiago Tidwell MD

## 2025-05-13 NOTE — TELEPHONE ENCOUNTER
Pt lvm requesting call back to verify which chol. Medication he should  from the Pharmacy.  Per CORBY, patient to  the Rosuvastatin since he had side effects from Atorvastatin

## 2025-06-17 DIAGNOSIS — E78.00 ELEVATED LDL CHOLESTEROL LEVEL: ICD-10-CM

## 2025-06-17 RX ORDER — ROSUVASTATIN CALCIUM 10 MG/1
10 TABLET, COATED ORAL DAILY
Qty: 90 TABLET | Refills: 0 | Status: SHIPPED | OUTPATIENT
Start: 2025-06-17 | End: 2026-07-22

## 2025-08-10 LAB
ALBUMIN SERPL-MCNC: 4.5 G/DL (ref 3.6–5.1)
ALP SERPL-CCNC: 59 U/L (ref 35–144)
ALT SERPL-CCNC: 15 U/L (ref 9–46)
ANION GAP SERPL CALCULATED.4IONS-SCNC: 9 MMOL/L (CALC) (ref 7–17)
AST SERPL-CCNC: 19 U/L (ref 10–35)
BILIRUB SERPL-MCNC: 1.7 MG/DL (ref 0.2–1.2)
BUN SERPL-MCNC: 12 MG/DL (ref 7–25)
CALCIUM SERPL-MCNC: 9.3 MG/DL (ref 8.6–10.3)
CHLORIDE SERPL-SCNC: 105 MMOL/L (ref 98–110)
CHOLEST SERPL-MCNC: 121 MG/DL
CHOLEST/HDLC SERPL: 2 (CALC)
CO2 SERPL-SCNC: 26 MMOL/L (ref 20–32)
CREAT SERPL-MCNC: 0.98 MG/DL (ref 0.7–1.35)
EGFRCR SERPLBLD CKD-EPI 2021: 86 ML/MIN/1.73M2
GLUCOSE SERPL-MCNC: 96 MG/DL (ref 65–99)
HDLC SERPL-MCNC: 61 MG/DL
LDLC SERPL CALC-MCNC: 45 MG/DL (CALC)
NONHDLC SERPL-MCNC: 60 MG/DL (CALC)
POTASSIUM SERPL-SCNC: 4.4 MMOL/L (ref 3.5–5.3)
PROT SERPL-MCNC: 6.7 G/DL (ref 6.1–8.1)
SODIUM SERPL-SCNC: 140 MMOL/L (ref 135–146)
TRIGL SERPL-MCNC: 69 MG/DL

## 2025-08-11 ENCOUNTER — APPOINTMENT (OUTPATIENT)
Dept: PRIMARY CARE | Facility: CLINIC | Age: 65
End: 2025-08-11
Payer: COMMERCIAL

## 2025-08-11 VITALS
HEIGHT: 72 IN | TEMPERATURE: 98.1 F | SYSTOLIC BLOOD PRESSURE: 132 MMHG | HEART RATE: 66 BPM | WEIGHT: 179.2 LBS | DIASTOLIC BLOOD PRESSURE: 82 MMHG | BODY MASS INDEX: 24.27 KG/M2

## 2025-08-11 DIAGNOSIS — E78.01 FAMILIAL HYPERCHOLESTEROLEMIA: Primary | ICD-10-CM

## 2025-08-11 DIAGNOSIS — Z82.49 FAMILY HISTORY OF ABDOMINAL AORTIC ANEURYSM (AAA): ICD-10-CM

## 2025-08-11 DIAGNOSIS — R93.1 AGATSTON CAC SCORE, >400: ICD-10-CM

## 2025-08-11 PROCEDURE — 1036F TOBACCO NON-USER: CPT | Performed by: FAMILY MEDICINE

## 2025-08-11 PROCEDURE — 99213 OFFICE O/P EST LOW 20 MIN: CPT | Performed by: FAMILY MEDICINE

## 2025-08-11 PROCEDURE — 3008F BODY MASS INDEX DOCD: CPT | Performed by: FAMILY MEDICINE

## 2025-08-11 ASSESSMENT — PATIENT HEALTH QUESTIONNAIRE - PHQ9
SUM OF ALL RESPONSES TO PHQ9 QUESTIONS 1 AND 2: 0
2. FEELING DOWN, DEPRESSED OR HOPELESS: NOT AT ALL
1. LITTLE INTEREST OR PLEASURE IN DOING THINGS: NOT AT ALL

## 2025-08-12 ASSESSMENT — ENCOUNTER SYMPTOMS
CONSTITUTIONAL NEGATIVE: 1
EYES NEGATIVE: 1
ALLERGIC/IMMUNOLOGIC NEGATIVE: 1
MUSCULOSKELETAL NEGATIVE: 1
ENDOCRINE NEGATIVE: 1
RESPIRATORY NEGATIVE: 1
GASTROINTESTINAL NEGATIVE: 1
CARDIOVASCULAR NEGATIVE: 1
PSYCHIATRIC NEGATIVE: 1
HEMATOLOGIC/LYMPHATIC NEGATIVE: 1

## 2025-08-14 ENCOUNTER — TELEPHONE (OUTPATIENT)
Dept: PRIMARY CARE | Facility: CLINIC | Age: 65
End: 2025-08-14
Payer: COMMERCIAL

## 2025-08-25 ENCOUNTER — HOSPITAL ENCOUNTER (OUTPATIENT)
Dept: RADIOLOGY | Facility: HOSPITAL | Age: 65
Discharge: HOME | End: 2025-08-25
Payer: COMMERCIAL

## 2025-08-25 ENCOUNTER — APPOINTMENT (OUTPATIENT)
Dept: PRIMARY CARE | Facility: CLINIC | Age: 65
End: 2025-08-25
Payer: COMMERCIAL

## 2025-08-25 VITALS
WEIGHT: 180 LBS | TEMPERATURE: 97.7 F | SYSTOLIC BLOOD PRESSURE: 134 MMHG | BODY MASS INDEX: 24.38 KG/M2 | HEART RATE: 56 BPM | DIASTOLIC BLOOD PRESSURE: 85 MMHG | HEIGHT: 72 IN

## 2025-08-25 DIAGNOSIS — S29.9XXA: ICD-10-CM

## 2025-08-25 DIAGNOSIS — R07.89 OTHER CHEST PAIN: ICD-10-CM

## 2025-08-25 DIAGNOSIS — Z78.9 NEVER SMOKED CIGARETTES: ICD-10-CM

## 2025-08-25 PROCEDURE — 71100 X-RAY EXAM RIBS UNI 2 VIEWS: CPT | Mod: RIGHT SIDE | Performed by: STUDENT IN AN ORGANIZED HEALTH CARE EDUCATION/TRAINING PROGRAM

## 2025-08-25 PROCEDURE — 71100 X-RAY EXAM RIBS UNI 2 VIEWS: CPT | Mod: RT

## 2025-08-25 PROCEDURE — 99213 OFFICE O/P EST LOW 20 MIN: CPT | Performed by: FAMILY MEDICINE

## 2025-08-25 PROCEDURE — 1036F TOBACCO NON-USER: CPT | Performed by: FAMILY MEDICINE

## 2025-08-25 PROCEDURE — 3008F BODY MASS INDEX DOCD: CPT | Performed by: FAMILY MEDICINE

## 2025-08-25 ASSESSMENT — ENCOUNTER SYMPTOMS
HEMATOLOGIC/LYMPHATIC NEGATIVE: 1
GASTROINTESTINAL NEGATIVE: 1
CONSTITUTIONAL NEGATIVE: 1
ENDOCRINE NEGATIVE: 1
ALLERGIC/IMMUNOLOGIC NEGATIVE: 1
RESPIRATORY NEGATIVE: 1
PSYCHIATRIC NEGATIVE: 1
MUSCULOSKELETAL NEGATIVE: 1
CARDIOVASCULAR NEGATIVE: 1
EYES NEGATIVE: 1

## 2025-08-25 ASSESSMENT — PATIENT HEALTH QUESTIONNAIRE - PHQ9
2. FEELING DOWN, DEPRESSED OR HOPELESS: NOT AT ALL
1. LITTLE INTEREST OR PLEASURE IN DOING THINGS: NOT AT ALL
SUM OF ALL RESPONSES TO PHQ9 QUESTIONS 1 AND 2: 0

## 2025-08-27 ENCOUNTER — RESULTS FOLLOW-UP (OUTPATIENT)
Dept: PRIMARY CARE | Facility: CLINIC | Age: 65
End: 2025-08-27
Payer: COMMERCIAL

## 2026-02-16 ENCOUNTER — APPOINTMENT (OUTPATIENT)
Dept: PRIMARY CARE | Facility: CLINIC | Age: 66
End: 2026-02-16
Payer: COMMERCIAL

## 2026-05-01 ENCOUNTER — APPOINTMENT (OUTPATIENT)
Dept: CARDIOLOGY | Facility: CLINIC | Age: 66
End: 2026-05-01
Payer: COMMERCIAL